# Patient Record
Sex: MALE | Race: WHITE | Employment: OTHER | URBAN - METROPOLITAN AREA
[De-identification: names, ages, dates, MRNs, and addresses within clinical notes are randomized per-mention and may not be internally consistent; named-entity substitution may affect disease eponyms.]

---

## 2017-06-14 ENCOUNTER — OFFICE VISIT (OUTPATIENT)
Dept: FAMILY MEDICINE | Facility: CLINIC | Age: 57
End: 2017-06-14
Payer: COMMERCIAL

## 2017-06-14 VITALS
DIASTOLIC BLOOD PRESSURE: 62 MMHG | HEART RATE: 82 BPM | RESPIRATION RATE: 14 BRPM | BODY MASS INDEX: 24.11 KG/M2 | WEIGHT: 178 LBS | OXYGEN SATURATION: 97 % | SYSTOLIC BLOOD PRESSURE: 118 MMHG | HEIGHT: 72 IN | TEMPERATURE: 97.6 F

## 2017-06-14 DIAGNOSIS — N48.1 BALANITIS: Primary | ICD-10-CM

## 2017-06-14 PROCEDURE — 99213 OFFICE O/P EST LOW 20 MIN: CPT | Performed by: FAMILY MEDICINE

## 2017-06-14 RX ORDER — NYSTATIN 100000 U/G
CREAM TOPICAL 3 TIMES DAILY
Qty: 15 G | Refills: 1 | Status: SHIPPED | OUTPATIENT
Start: 2017-06-14 | End: 2017-06-28

## 2017-06-14 ASSESSMENT — PAIN SCALES - GENERAL: PAINLEVEL: NO PAIN (0)

## 2017-06-14 NOTE — PATIENT INSTRUCTIONS
Balanitis    Balanitis is an inflammation of the head of the penis. It can happen because of a buildup of germs (bacteria, viruses, or fungi) under the foreskin. It can also happen because of exposure to soaps and other chemicals. In adults, this is most often a complication of diabetes. It can also happen because of obesity or poor genital cleaning habits.  If it is not treated right away, this can lead to a condition called phimosis. This means you cannot pull back the foreskin from the head of the penis.  Symptoms of balanitis may include pain or tenderness of the penis, discharge, inability to retract the foreskin, difficulty urinating, and impotence.  Home care  The following guidelines will help you care for your condition at home:    If you are able to retract your foreskin:    Children. Retract the foreskin and clean with water. Apply antibiotic cream or ointment to the penis three times a day.    Adults. Retract the foreskin and clean with water. Apply clotrimazole cream to the penis 3 times a day unless another medicine was prescribed. Clotrimazole cream is available over the counter. Avoid sexual activity while being treated.    Sitz baths. Soak the penis and foreskin in warm water while inflammation is present.    If you have diabetes, talk with your doctor about keeping your diabetes in good control.    If you are overweight, talk with your doctor about a weight loss plan.  Follow-up care  Follow up with your healthcare provider, or as advised.  When to seek medical advice  Call your healthcare provider right away if any of these occur:    You can't retract the foreskin    You can't return the retracted foreskin to the forward position. This requires immediate attention!    Your symptoms get worse    You have partial or complete blockage of the flow of urine  Date Last Reviewed: 9/1/2016 2000-2017 [employer]. [address] [place] [address], [place] [address]. All rights reserved. This  information is not intended as a substitute for professional medical care. Always follow your healthcare professional's instructions.

## 2017-06-14 NOTE — MR AVS SNAPSHOT
After Visit Summary   6/14/2017    Miguel Guzman    MRN: [id number]           Patient Information     Date Of Birth          1960        Visit Information        Provider Department      6/14/2017 5:00 PM Juan Orellana MD [facility]        Today's Diagnoses     Balanitis    -  1      Care Instructions      Balanitis    Balanitis is an inflammation of the head of the penis. It can happen because of a buildup of germs (bacteria, viruses, or fungi) under the foreskin. It can also happen because of exposure to soaps and other chemicals. In adults, this is most often a complication of diabetes. It can also happen because of obesity or poor genital cleaning habits.  If it is not treated right away, this can lead to a condition called phimosis. This means you cannot pull back the foreskin from the head of the penis.  Symptoms of balanitis may include pain or tenderness of the penis, discharge, inability to retract the foreskin, difficulty urinating, and impotence.  Home care  The following guidelines will help you care for your condition at home:    If you are able to retract your foreskin:    Children. Retract the foreskin and clean with water. Apply antibiotic cream or ointment to the penis three times a day.    Adults. Retract the foreskin and clean with water. Apply clotrimazole cream to the penis 3 times a day unless another medicine was prescribed. Clotrimazole cream is available over the counter. Avoid sexual activity while being treated.    Sitz baths. Soak the penis and foreskin in warm water while inflammation is present.    If you have diabetes, talk with your doctor about keeping your diabetes in good control.    If you are overweight, talk with your doctor about a weight loss plan.  Follow-up care  Follow up with your healthcare provider, or as advised.  When to seek medical advice  Call your healthcare provider right away if any of these occur:    You can't retract the  "foreskin    You can't return the retracted foreskin to the forward position. This requires immediate attention!    Your symptoms get worse    You have partial or complete blockage of the flow of urine  Date Last Reviewed: 9/1/2016 2000-2017 [employer]. [address] [place] [address]. All rights reserved. This information is not intended as a substitute for professional medical care. Always follow your healthcare professional's instructions.                Follow-ups after your visit        Follow-up notes from your care team     Return if symptoms worsen or fail to improve.      Who to contact     If you have questions or need follow up information about today's clinic visit or your schedule please contact [facility] directly at [phone].  Normal or non-critical lab and imaging results will be communicated to you by [employer], letter or phone within 4 business days after the clinic has received the results. If you do not hear from us within 7 days, please contact the clinic through [employer] or phone. If you have a critical or abnormal lab result, we will notify you by phone as soon as possible.  Submit refill requests through [employer] or call your pharmacy and they will forward the refill request to us. Please allow 3 business days for your refill to be completed.          Additional Information About Your Visit        [employer] Information     [employer] lets you send messages to your doctor, view your test results, renew your prescriptions, schedule appointments and more. To sign up, go to [email or web address] . Click on \"Log in\" on the left side of the screen, which will take you to the Welcome page. Then click on \"Sign up Now\" on the right side of the page.     You will be asked to enter the access code listed below, as well as some personal information. Please follow the directions to create your username and password.     Your access code is: [email or web address]  Expires: " 2017  5:09 PM     Your access code will  in 90 days. If you need help or a new code, please call your [place] clinic or [phone].        Care EveryWhere ID     This is your Care EveryWhere ID. This could be used by other organizations to access your [place] medical records  [id number]        Your Vitals Were     Pulse Temperature Respirations Height Pulse Oximetry BMI (Body Mass Index)    82 97.6  F (36.4  C) (Temporal) 14 6' (1.829 m) 97% 24.14 kg/m2       Blood Pressure from Last 3 Encounters:   17 118/62   14 104/68   13 118/82    Weight from Last 3 Encounters:   17 178 lb (80.7 kg)   14 181 lb (82.1 kg)   13 185 lb (83.9 kg)              Today, you had the following     No orders found for display         Today's Medication Changes          These changes are accurate as of: 17  5:09 PM.  If you have any questions, ask your nurse or doctor.               Start taking these medicines.        Dose/Directions    nystatin cream   Commonly known as:  MYCOSTATIN   Used for:  Balanitis   Started by:  Juan Orellana MD        Apply topically 3 times daily for 14 days   Quantity:  15 g   Refills:  1            Where to get your medicines      These medications were sent to [facility] [place], [place] - [address]   [address] , [facility] [address]     Phone:  [phone]     nystatin cream                Primary Care Provider    None Specified       No primary provider on file.        Thank you!     Thank you for choosing [facility]  for your care. Our goal is always to provide you with excellent care. Hearing back from our patients is one way we can continue to improve our services. Please take a few minutes to complete the written survey that you may receive in the mail after your visit with us. Thank you!             Your Updated Medication List - Protect others around you: Learn how to safely use, store and throw  away your medicines at www.disposemymeds.org.          This list is accurate as of: 6/14/17  5:09 PM.  Always use your most recent med list.                   Brand Name Dispense Instructions for use    nystatin cream    MYCOSTATIN    15 g    Apply topically 3 times daily for 14 days

## 2017-06-14 NOTE — PROGRESS NOTES
SUBJECTIVE:                                                    Miguel Guzman is a 56 year old male who presents to clinic today for the following health issues:    Rash     Onset: 1 month to 2 months     Description:   Location: groin  Character: round, flakey, red  Itching (Pruritis): YES    Progression of Symptoms:  same and intermittent    Accompanying Signs & Symptoms:  Fever: no   Body aches or joint pain: no   Sore throat symptoms: no   Recent cold symptoms: no    History:   Previous similar rash: maybe last year but went a way on own    Precipitating factors:   Exposure to similar rash: no   New exposures: soaps no fragrance   Recent travel: no     Alleviating factors:       Therapies Tried and outcome: Aleo           Problem list and histories reviewed & adjusted, as indicated.  Additional history: Improved with drying. Worse with wetness after sex.     Patient Active Problem List   Diagnosis     Neurofibromatosis, type 2 (acoustic neurofibromatosis) (H)     Hypertriglyceridemia     CARDIOVASCULAR SCREENING; LDL GOAL LESS THAN 160     Past Surgical History:   Procedure Laterality Date     C SHOULDER SURG PROC UNLISTED       COLONOSCOPY  3/31/2009    polyps     SURGICAL HISTORY OF -   07/18/2005    Excision of left acoustic neuroma with a translabyrinthine approach.       Social History   Substance Use Topics     Smoking status: Never Smoker     Smokeless tobacco: Never Used     Alcohol use Yes      Comment: 2 beers per week     No family history on file.      No current outpatient prescriptions on file.     Allergies   Allergen Reactions     No Known Drug Allergies      Recent Labs   Lab Test  07/24/14   1441  10/25/11   1658   LDL  108   --    HDL  28*   --    TRIG  205*   --    CR   --   1.28*   GFRESTIMATED   --   59*   GFRESTBLACK   --   71   POTASSIUM   --   4.1   TSH   --   0.95      BP Readings from Last 3 Encounters:   06/14/17 118/62   07/24/14 104/68   09/16/13 118/82    Wt Readings from Last 3  Encounters:   06/14/17 178 lb (80.7 kg)   07/24/14 181 lb (82.1 kg)   09/16/13 185 lb (83.9 kg)                    Reviewed and updated as needed this visit by clinical staff  Tobacco  Allergies  Meds  Problems       Reviewed and updated as needed this visit by Provider  Allergies  Meds  Problems         ROS:  C: NEGATIVE for fever, chills, change in weight  INTEGUMENTARY/SKIN: POSITIVE for rash genitalia  E/M: NEGATIVE for ear, mouth and throat problems  R: NEGATIVE for significant cough or SOB  CV: NEGATIVE for chest pain, palpitations or peripheral edema  ROS otherwise negative    OBJECTIVE:                                                    /62 (BP Location: Left arm, Patient Position: Sitting, Cuff Size: Adult Regular)  Pulse 82  Temp 97.6  F (36.4  C) (Temporal)  Resp 14  Ht 6' (1.829 m)  Wt 178 lb (80.7 kg)  SpO2 97%  BMI 24.14 kg/m2  Body mass index is 24.14 kg/(m^2).  GENERAL: healthy, alert and no distress   (male): normal male genitalia. Erythematous patch on the underside of the penis shaft extending up onto the glans without lesions or urethral discharge, no hernia    Diagnostic Test Results:  none      ASSESSMENT/PLAN:                                                      1. Balanitis  Acute most likely fungal associated with chronic moisture.   - nystatin (MYCOSTATIN) cream; Apply topically 3 times daily for 14 days  Dispense: 15 g; Refill: 1    Patient Instructions     Balanitis    Balanitis is an inflammation of the head of the penis. It can happen because of a buildup of germs (bacteria, viruses, or fungi) under the foreskin. It can also happen because of exposure to soaps and other chemicals. In adults, this is most often a complication of diabetes. It can also happen because of obesity or poor genital cleaning habits.  If it is not treated right away, this can lead to a condition called phimosis. This means you cannot pull back the foreskin from the head of the  penis.  Symptoms of balanitis may include pain or tenderness of the penis, discharge, inability to retract the foreskin, difficulty urinating, and impotence.  Home care  The following guidelines will help you care for your condition at home:    If you are able to retract your foreskin:    Children. Retract the foreskin and clean with water. Apply antibiotic cream or ointment to the penis three times a day.    Adults. Retract the foreskin and clean with water. Apply clotrimazole cream to the penis 3 times a day unless another medicine was prescribed. Clotrimazole cream is available over the counter. Avoid sexual activity while being treated.    Sitz baths. Soak the penis and foreskin in warm water while inflammation is present.    If you have diabetes, talk with your doctor about keeping your diabetes in good control.    If you are overweight, talk with your doctor about a weight loss plan.  Follow-up care  Follow up with your healthcare provider, or as advised.  When to seek medical advice  Call your healthcare provider right away if any of these occur:    You can't retract the foreskin    You can't return the retracted foreskin to the forward position. This requires immediate attention!    Your symptoms get worse    You have partial or complete blockage of the flow of urine  Date Last Reviewed: 9/1/2016 2000-2017 [employer]. [address] [place] [address], [place] [address]. All rights reserved. This information is not intended as a substitute for professional medical care. Always follow your healthcare professional's instructions.            Juan Orellana MD  [facility]

## 2017-06-14 NOTE — NURSING NOTE
Chief Complaint   Patient presents with     Derm Problem       Initial /62 (BP Location: Left arm, Patient Position: Sitting, Cuff Size: Adult Regular)  Pulse 82  Temp 97.6  F (36.4  C) (Temporal)  Resp 14  Ht 6' (1.829 m)  Wt 178 lb (80.7 kg)  SpO2 97%  BMI 24.14 kg/m2 Estimated body mass index is 24.14 kg/(m^2) as calculated from the following:    Height as of this encounter: 6' (1.829 m).    Weight as of this encounter: 178 lb (80.7 kg).  Medication Reconciliation: complete

## 2017-10-04 ENCOUNTER — HOSPITAL ENCOUNTER (EMERGENCY)
Facility: CLINIC | Age: 57
Discharge: HOME | End: 2017-10-04
Attending: EMERGENCY MEDICINE | Admitting: EMERGENCY MEDICINE
Payer: COMMERCIAL

## 2017-10-04 VITALS
DIASTOLIC BLOOD PRESSURE: 94 MMHG | OXYGEN SATURATION: 99 % | SYSTOLIC BLOOD PRESSURE: 148 MMHG | TEMPERATURE: 98.1 F | RESPIRATION RATE: 18 BRPM | HEART RATE: 87 BPM
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
## 2017-10-04 DIAGNOSIS — Z23 NEED FOR VACCINATION: ICD-10-CM

## 2017-10-04 DIAGNOSIS — S61.211A LACERATION OF LEFT INDEX FINGER WITHOUT FOREIGN BODY WITHOUT DAMAGE TO NAIL, INITIAL ENCOUNTER: ICD-10-CM

## 2017-10-04 PROCEDURE — 90715 TDAP VACCINE 7 YRS/> IM: CPT | Performed by: EMERGENCY MEDICINE

## 2017-10-04 PROCEDURE — 25000125 ZZHC RX 250: Performed by: EMERGENCY MEDICINE

## 2017-10-04 PROCEDURE — 12001 RPR S/N/AX/GEN/TRNK 2.5CM/<: CPT | Performed by: EMERGENCY MEDICINE

## 2017-10-04 PROCEDURE — 12001 RPR S/N/AX/GEN/TRNK 2.5CM/<: CPT | Mod: Z6 | Performed by: EMERGENCY MEDICINE

## 2017-10-04 PROCEDURE — 90471 IMMUNIZATION ADMIN: CPT | Performed by: EMERGENCY MEDICINE

## 2017-10-04 PROCEDURE — 99284 EMERGENCY DEPT VISIT MOD MDM: CPT | Mod: 25 | Performed by: EMERGENCY MEDICINE

## 2017-10-04 PROCEDURE — 25000128 H RX IP 250 OP 636: Performed by: EMERGENCY MEDICINE

## 2017-10-04 PROCEDURE — 99283 EMERGENCY DEPT VISIT LOW MDM: CPT | Mod: 25 | Performed by: EMERGENCY MEDICINE

## 2017-10-04 RX ADMIN — CLOSTRIDIUM TETANI TOXOID ANTIGEN (FORMALDEHYDE INACTIVATED), CORYNEBACTERIUM DIPHTHERIAE TOXOID ANTIGEN (FORMALDEHYDE INACTIVATED), BORDETELLA PERTUSSIS TOXOID ANTIGEN (GLUTARALDEHYDE INACTIVATED), BORDETELLA PERTUSSIS FILAMENTOUS HEMAGGLUTININ ANTIGEN (FORMALDEHYDE INACTIVATED), BORDETELLA PERTUSSIS PERTACTIN ANTIGEN, AND BORDETELLA PERTUSSIS FIMBRIAE 2/3 ANTIGEN 0.5 ML: 5; 2; 2.5; 5; 3; 5 INJECTION, SUSPENSION INTRAMUSCULAR at 19:28

## 2017-10-04 RX ADMIN — LIDOCAINE HYDROCHLORIDE 2 ML: 10 INJECTION, SOLUTION EPIDURAL; INFILTRATION; INTRACAUDAL; PERINEURAL at 19:33

## 2017-10-04 ASSESSMENT — ENCOUNTER SYMPTOMS: WOUND: 1

## 2017-10-04 NOTE — ED PROVIDER NOTES
History     Chief Complaint   Patient presents with     Laceration     finger     The history is provided by the patient.     Miguel Guzman is a 57 year old male who presents to the emergency department with concerns of a finger laceration. The patient states that he was cutting a piece of pine with a saw and the saw blade caught a knot in the board. The board got spun around and sliced the tip of his left 2nd digit, leaving a flap laceration. He wrapped his finger in gauze and tape after the incident occurred.     I have reviewed the Medications, Allergies, Past Medical and Surgical History, and Social History in the Epic system.    Patient Active Problem List   Diagnosis     Neurofibromatosis, type 2 (acoustic neurofibromatosis) (H)     Hypertriglyceridemia     CARDIOVASCULAR SCREENING; LDL GOAL LESS THAN 160     Past Medical History:   Diagnosis Date     Neurofibromatosis, type 2 (acoustic neurofibromatosis) (H)        Past Surgical History:   Procedure Laterality Date     C SHOULDER SURG PROC UNLISTED       COLONOSCOPY  3/31/2009    polyps     SURGICAL HISTORY OF -   07/18/2005    Excision of left acoustic neuroma with a translabyrinthine approach.     No family history on file.    Social History   Substance Use Topics     Smoking status: Never Smoker     Smokeless tobacco: Never Used     Alcohol use Yes      Comment: 2 beers per week        Immunization History   Administered Date(s) Administered     Influenza (IIV3) 11/04/2011     TD (ADULT, 7+) 07/13/2005     Allergies   Allergen Reactions     No Known Drug Allergies      No current outpatient prescriptions on file.     Review of Systems   Skin: Positive for wound.   All other systems reviewed and are negative.      Physical Exam   BP: 148/88  Pulse: 79  Temp: 98.1  F (36.7  C)  Resp: 18  SpO2: 99 %  Physical Exam   Constitutional: He is oriented to person, place, and time. He appears well-developed and well-nourished.   HENT:   Head: Atraumatic.   Eyes:  "EOM are normal.   Neck: Neck supple.   Cardiovascular: Normal rate, regular rhythm and normal heart sounds.    Pulmonary/Chest: Effort normal and breath sounds normal.   Abdominal: Soft. Bowel sounds are normal.   Musculoskeletal: Normal range of motion.        Hands:  Neurological: He is alert and oriented to person, place, and time.   Skin: Skin is warm and dry. Laceration (left second digit) noted.   Psychiatric: He has a normal mood and affect. His behavior is normal.   Nursing note and vitals reviewed.      ED Course     ED Course     Laceration repair  Date/Time: 10/4/2017 6:55 PM  Performed by: MARY SERRANO  Authorized by: MARY SERRANO   Consent: Verbal consent obtained.  Consent given by: patient  Patient identity confirmed: verbally with patient and arm band  Time out: Immediately prior to procedure a \"time out\" was called to verify the correct patient, procedure, equipment, support staff and site/side marked as required.  Body area: upper extremity  Location details: left index finger  Laceration length: 1.1 cm  Foreign bodies: no foreign bodies  Anesthesia: local infiltration    Anesthesia:  Local Anesthetic: lidocaine 1% without epinephrine  Anesthetic total: 2 mL  Preparation: Patient was prepped and draped in the usual sterile fashion.  Irrigation solution: tap water  Amount of cleaning: standard  Skin closure: 4-0 nylon and Ethilon  Number of sutures: 7  Technique: simple  Approximation: close  Approximation difficulty: simple  Dressing: antibiotic ointment, 4x4 sterile gauze and tube gauze  Patient tolerance: Patient tolerated the procedure well with no immediate complications                  No results found for this or any previous visit (from the past 24 hour(s)).    Medications   lidocaine 1 % 2 mL (not administered)   Tdap (tetanus-diphtheria-acell pertussis) (ADACEL) injection 0.5 mL (not administered)     Assessments & Plan (with Medical Decision Making)  Miguel Guzman " is a 57-year-old male who presents to the ED for evaluation and treatment of the left index finger injury that occurred when he was cutting a piece of pine wood with a saw and the wood kicked back striking him in the finger causing the laceration.  On exam, he has a 1.1 cm flap laceration to the tip of the left index finger that just approaches the nailbed.  In addition, his last tetanus booster was in 2005 so we will need to administer a TDaP today.  The wound was opened and examined after placement of a turnicot.  The wound was cleaned out and anesthetized with 1% lidocaine without epinephrine.  The wound was then closed using 4-0 Ethilon requiring 7 sutures with good coaptation of the edges.  Patient tolerated this procedure well.  I did discuss with him indications to return to the ED including recurrent bleeding, infection, increasing pain, or numbness.  Otherwise, he will have the sutures removed at the [facility] in 10 days.  All questions were answered and he was suitable for discharge in satisfactory condition.       I have reviewed the nursing notes.    I have reviewed the findings, diagnosis, plan and need for follow up with the patient.       New Prescriptions    No medications on file       Final diagnoses:   Laceration of left index finger without foreign body without damage to nail, initial encounter     This document serves as a record of services personally performed by Martínez Mckeon MD. It was created on their behalf by Nai Braun, a trained medical scribe. The creation of this record is based on the provider's personal observations and the statements of the patient. This document has been checked and approved by the attending provider.    Note: Chart documentation done in part with Dragon Voice Recognition software. Although reviewed after completion, some word and grammatical errors may remain.    10/4/2017   [facility] EMERGENCY DEPARTMENT     Martínez Mckeon  MD KYLEIGH  10/04/17 [id number]

## 2017-10-04 NOTE — ED AVS SNAPSHOT
[facility] Emergency Department    [address] [place] [address]    Phone:  [phone]    Fax:  [phone]                                       Miguel Guzman   MRN: [id number]    Department:  [facility] Emergency Department   Date of Visit:  10/4/2017           After Visit Summary Signature Page     I have received my discharge instructions, and my questions have been answered. I have discussed any challenges I see with this plan with the nurse or doctor.    ..........................................................................................................................................  Patient/Patient Representative Signature      ..........................................................................................................................................  Patient Representative Print Name and Relationship to Patient    ..................................................               ................................................  Date                                            Time    ..........................................................................................................................................  Reviewed by Signature/Title    ...................................................              ..............................................  Date                                                            Time

## 2017-10-04 NOTE — ED AVS SNAPSHOT
[facility] Emergency Department    [address] [place] [address]    Phone:  [phone]    Fax:  [phone]                                       Miguel Guzman   MRN: [id number]    Department:  [facility] Emergency Department   Date of Visit:  10/4/2017           Patient Information     Date Of Birth          1960        Your diagnoses for this visit were:     Laceration of left index finger without foreign body without damage to nail, initial encounter        You were seen by Martínez Mckeon MD.      Follow-up Information     Follow up with [facility]. Schedule an appointment as soon as possible for a visit in 10 days.    Why:  For suture removal    Contact information:    [address] [facility] [phone]      Discharge References/Attachments     LACERATION, EXTREMITY: SUTURE, STAPLE, OR TAPE (ENGLISH)      24 Hour Appointment Hotline       To make an appointment at any [facility], call [phone] ([phone]). If you don't have a family doctor or clinic, we will help you find one. [facility] are conveniently located to serve the needs of you and your family.             Review of your medicines      Notice     You have not been prescribed any medications.            Orders Needing Specimen Collection     None      Pending Results     No orders found from 10/2/2017 to 10/5/2017.            Pending Culture Results     No orders found from 10/2/2017 to 10/5/2017.            Pending Results Instructions     If you had any lab results that were not finalized at the time of your Discharge, you can call the ED Lab Result RN at [phone]. You will be contacted by this team for any positive Lab results or changes in treatment. The nurses are available 7 days a week from 10A to 6:30P.  You can leave a message 24 hours per day and they will return your call.        Thank you for choosing [facility]       Thank  "you for choosing [facility] for your care. Our goal is always to provide you with excellent care. Hearing back from our patients is one way we can continue to improve our services. Please take a few minutes to complete the written survey that you may receive in the mail after you visit with us. Thank you!        [facility] Information     [employer] lets you send messages to your doctor, view your test results, renew your prescriptions, schedule appointments and more. To sign up, go to [email or web address] . Click on \"Log in\" on the left side of the screen, which will take you to the Welcome page. Then click on \"Sign up Now\" on the right side of the page.     You will be asked to enter the access code listed below, as well as some personal information. Please follow the directions to create your username and password.     Your access code is: [email or web address]  Expires: 2018  7:24 PM     Your access code will  in 90 days. If you need help or a new code, please call your [facility] clinic or [phone].        Care EveryWhere ID     This is your Care EveryWhere ID. This could be used by other organizations to access your [facility] medical records  [id number]        Equal Access to Services     MITUL SNOW : Anisa Fountain, bibi marcano, qaphilip kasofia feliciano, keisha matthews. So [facility] [phone].    ATENCIÓN: Si habla español, tiene a thomas disposición servicios gratuitos de asistencia lingüística. Llame al [phone].    We comply with applicable federal civil rights laws and [place] laws. We do not discriminate on the basis of race, color, national origin, age, disability, sex, sexual orientation, or gender identity.            After Visit Summary       This is your record. Keep this with you and show to your community pharmacist(s) and doctor(s) at your next visit.                  "

## 2017-10-04 NOTE — ED NOTES
PT was using an electric saw when it kicked back injuring left index finger.  Blooding controlled.  Last dt:  2005 per [facility]

## 2017-10-16 ENCOUNTER — ALLIED HEALTH/NURSE VISIT (OUTPATIENT)
Dept: FAMILY MEDICINE | Facility: CLINIC | Age: 57
End: 2017-10-16
Payer: COMMERCIAL

## 2017-10-16 VITALS — TEMPERATURE: 97.4 F

## 2017-10-16 DIAGNOSIS — Z48.02 ENCOUNTER FOR REMOVAL OF SUTURES: Primary | ICD-10-CM

## 2017-10-16 PROCEDURE — 99207 ZZC NO CHARGE NURSE ONLY: CPT

## 2017-10-16 NOTE — MR AVS SNAPSHOT
"              After Visit Summary   10/16/2017    Miguel Guzman    MRN: [id number]           Patient Information     Date Of Birth          1960        Visit Information        Provider Department      10/16/2017 4:00 PM NL RN TEAM A, [facility]        Today's Diagnoses     Encounter for removal of sutures    -  1       Follow-ups after your visit        Who to contact     If you have questions or need follow up information about today's clinic visit or your schedule please contact [facility] directly at [phone].  Normal or non-critical lab and imaging results will be communicated to you by MyChart, letter or phone within 4 business days after the clinic has received the results. If you do not hear from us within 7 days, please contact the clinic through [facility] or phone. If you have a critical or abnormal lab result, we will notify you by phone as soon as possible.  Submit refill requests through [employer] or call your pharmacy and they will forward the refill request to us. Please allow 3 business days for your refill to be completed.          Additional Information About Your Visit        MyChart Information     [employer] lets you send messages to your doctor, view your test results, renew your prescriptions, schedule appointments and more. To sign up, go to [email or web address] . Click on \"Log in\" on the left side of the screen, which will take you to the Welcome page. Then click on \"Sign up Now\" on the right side of the page.     You will be asked to enter the access code listed below, as well as some personal information. Please follow the directions to create your username and password.     Your access code is: [email or web address]  Expires: 2018  7:24 PM     Your access code will  in 90 days. If you need help or a new code, please call your [facility] or [phone].        Care EveryWhere ID     This is your Care EveryWhere ID. This could be used by " other organizations to access your [facility] medical records  [id number]        Your Vitals Were     Temperature                   97.4  F (36.3  C) (Temporal)            Blood Pressure from Last 3 Encounters:   10/04/17 (!) 148/94   06/14/17 118/62   07/24/14 104/68    Weight from Last 3 Encounters:   06/14/17 178 lb (80.7 kg)   07/24/14 181 lb (82.1 kg)   09/16/13 185 lb (83.9 kg)              Today, you had the following     No orders found for display       Primary Care Provider    Physician No Ref-Primary       NO REF-PRIMARY PHYSICIAN        Equal Access to Services     MADAI [facility] Center[facility]ISSATOU : Hadmalina Fountain, bibi marcano, teodoro feliciano, keisha hernandez . So [facility] [phone].    ATENCIÓN: Si habla español, tiene a thomas disposición servicios gratuitos de asistencia lingüística. Llame al [phone].    We comply with applicable federal civil rights laws and [place] laws. We do not discriminate on the basis of race, color, national origin, age, disability, sex, sexual orientation, or gender identity.            Thank you!     Thank you for choosing [facility]  for your care. Our goal is always to provide you with excellent care. Hearing back from our patients is one way we can continue to improve our services. Please take a few minutes to complete the written survey that you may receive in the mail after your visit with us. Thank you!             Your Updated Medication List - Protect others around you: Learn how to safely use, store and throw away your medicines at www.disposemymeds.org.      Notice  As of 10/16/2017  4:23 PM    You have not been prescribed any medications.

## 2018-05-03 ENCOUNTER — TRANSFERRED RECORDS (OUTPATIENT)
Dept: MULTI SPECIALTY CLINIC | Facility: CLINIC | Age: 58
End: 2018-05-03

## 2018-10-01 NOTE — NURSING NOTE
"Miguel Guzman presents to the clinic today for  removal of sutures.  The patient has had the sutures in place for 12 days.    There has been no history of infection or drainage.    O: 6 sutures are seen located on the Left index finger.  The wound is healing well with no signs of infection.    Tetanus status is up to date.    A: Suture removal. \" Dr. HOUSTON said he thought he put in 8 , then he recounted and he thought 7 , but I see only 6.\"  RN only saw 6 as well.  Pt was instructed to call us back if when his scab falls off and there is a suture string sticking out of his skin to come back in and we will remove it.     P:  All sutures were easily removed today.  Routine wound care discussed.  The patient will follow up as needed.  JAMAICA Carnes        "
162.56

## 2022-06-23 ENCOUNTER — OFFICE VISIT (OUTPATIENT)
Dept: FAMILY MEDICINE | Facility: CLINIC | Age: 62
End: 2022-06-23
Payer: COMMERCIAL

## 2022-06-23 ENCOUNTER — TELEPHONE (OUTPATIENT)
Dept: FAMILY MEDICINE | Facility: CLINIC | Age: 62
End: 2022-06-23

## 2022-06-23 VITALS
WEIGHT: 189 LBS | RESPIRATION RATE: 14 BRPM | TEMPERATURE: 97.8 F | DIASTOLIC BLOOD PRESSURE: 82 MMHG | OXYGEN SATURATION: 98 % | SYSTOLIC BLOOD PRESSURE: 138 MMHG | HEART RATE: 63 BPM | HEIGHT: 71 IN | BODY MASS INDEX: 26.46 KG/M2

## 2022-06-23 DIAGNOSIS — R39.9 LOWER URINARY TRACT SYMPTOMS (LUTS): ICD-10-CM

## 2022-06-23 DIAGNOSIS — Q85.02 NEUROFIBROMATOSIS, TYPE 2 (ACOUSTIC NEUROFIBROMATOSIS) (H): ICD-10-CM

## 2022-06-23 DIAGNOSIS — Z13.0 SCREENING, ANEMIA, DEFICIENCY, IRON: ICD-10-CM

## 2022-06-23 DIAGNOSIS — Z87.39 HISTORY OF GOUT: Primary | ICD-10-CM

## 2022-06-23 DIAGNOSIS — Z13.1 SCREENING FOR DIABETES MELLITUS: ICD-10-CM

## 2022-06-23 DIAGNOSIS — Z12.11 SCREEN FOR COLON CANCER: ICD-10-CM

## 2022-06-23 DIAGNOSIS — Z13.220 SCREENING FOR HYPERLIPIDEMIA: ICD-10-CM

## 2022-06-23 DIAGNOSIS — K21.00 GASTROESOPHAGEAL REFLUX DISEASE WITH ESOPHAGITIS WITHOUT HEMORRHAGE: ICD-10-CM

## 2022-06-23 LAB
ANION GAP SERPL CALCULATED.3IONS-SCNC: 3 MMOL/L (ref 3–14)
BUN SERPL-MCNC: 18 MG/DL (ref 7–30)
CALCIUM SERPL-MCNC: 9.3 MG/DL (ref 8.5–10.1)
CHLORIDE BLD-SCNC: 107 MMOL/L (ref 94–109)
CHOLEST SERPL-MCNC: 167 MG/DL
CO2 SERPL-SCNC: 30 MMOL/L (ref 20–32)
CREAT SERPL-MCNC: 1.16 MG/DL (ref 0.66–1.25)
ERYTHROCYTE [DISTWIDTH] IN BLOOD BY AUTOMATED COUNT: 12 % (ref 10–15)
FASTING STATUS PATIENT QL REPORTED: NO
GFR SERPL CREATININE-BSD FRML MDRD: 71 ML/MIN/1.73M2
GLUCOSE BLD-MCNC: 101 MG/DL (ref 70–99)
HCT VFR BLD AUTO: 53 % (ref 40–53)
HDLC SERPL-MCNC: 36 MG/DL
HGB BLD-MCNC: 17.6 G/DL (ref 13.3–17.7)
LDLC SERPL CALC-MCNC: 99 MG/DL
MCH RBC QN AUTO: 31 PG (ref 26.5–33)
MCHC RBC AUTO-ENTMCNC: 33.2 G/DL (ref 31.5–36.5)
MCV RBC AUTO: 94 FL (ref 78–100)
NONHDLC SERPL-MCNC: 131 MG/DL
PLATELET # BLD AUTO: 175 10E3/UL (ref 150–450)
POTASSIUM BLD-SCNC: 4.7 MMOL/L (ref 3.4–5.3)
PSA SERPL-MCNC: 1.29 UG/L (ref 0–4)
RBC # BLD AUTO: 5.67 10E6/UL (ref 4.4–5.9)
SODIUM SERPL-SCNC: 140 MMOL/L (ref 133–144)
TRIGL SERPL-MCNC: 159 MG/DL
URATE SERPL-MCNC: 10.2 MG/DL (ref 3.5–7.2)
WBC # BLD AUTO: 5.6 10E3/UL (ref 4–11)

## 2022-06-23 PROCEDURE — G0103 PSA SCREENING: HCPCS | Performed by: FAMILY MEDICINE

## 2022-06-23 PROCEDURE — 36415 COLL VENOUS BLD VENIPUNCTURE: CPT | Performed by: FAMILY MEDICINE

## 2022-06-23 PROCEDURE — 80061 LIPID PANEL: CPT | Performed by: FAMILY MEDICINE

## 2022-06-23 PROCEDURE — 80048 BASIC METABOLIC PNL TOTAL CA: CPT | Performed by: FAMILY MEDICINE

## 2022-06-23 PROCEDURE — 99204 OFFICE O/P NEW MOD 45 MIN: CPT | Performed by: FAMILY MEDICINE

## 2022-06-23 PROCEDURE — 85027 COMPLETE CBC AUTOMATED: CPT | Performed by: FAMILY MEDICINE

## 2022-06-23 PROCEDURE — 84550 ASSAY OF BLOOD/URIC ACID: CPT | Performed by: FAMILY MEDICINE

## 2022-06-23 NOTE — TELEPHONE ENCOUNTER
Number listed is no longer in service. Letter was sent.    Yessy Jones CMA ([facility])

## 2022-06-23 NOTE — RESULT ENCOUNTER NOTE
Please inform of results if patient has not viewed in [employer] within 3 business days.    CBC Results - Your cell counts were normal.    Your other labs are pending.    Please call the clinic with any questions you may have.     Have a great day,    Dr. Mortensen

## 2022-06-23 NOTE — RESULT ENCOUNTER NOTE
"Please inform of results if patient has not viewed in [employer] within 3 business days.    PSA - Your Prostate cancer screening lab results were normal.    Lipids - Your good cholesterol labs were slightly low. This can be improved with exercise. Your \"bad\" cholesterol lab results were normal.    BMP - Your blood sugar was 101. Your kidney function and electrolytes were normal.    Uric Acid - Your uric acid was 10.2. This is above our goal of 6. If you want to start a medication (allopurinol) for this please let me know and we will start at 100 mg as discussed and work our way up.    Please call the clinic with any questions you may have.     Have a great day,    Dr. Mortensen"

## 2022-06-23 NOTE — PROGRESS NOTES
Assessment & Plan   1. History of gout: Discussed medications (allopurinol) to reduce uric acid and prevent gout flares. Discussed paradoxical gout flares with initiation and dose changes of allopurinol and possible need for indomethacin/cholcecine to reduce gout flares. Indomethacin may worsen GERD. He is not wishing for medications at this time. Ok with checking uric acid level. Discussed wanting this below 6. Discussed low purine diet. May have to look at purine free beer if that is something patient wishes to partake in. Patient will let me know based on uric acid level if he wants to start allopurinol or not.  - Uric acid; Future  - Basic metabolic panel  (Ca, Cl, CO2, Creat, Gluc, K, Na, BUN); Future  - Uric acid    2. Gastroesophageal reflux disease with esophagitis without hemorrhage: Discussed use of wedge pillow. Discussed triggering foods avoidance. Controlled with omeprazole.    3. Lower urinary tract symptoms (LUTS): Not interested in Flomax trial. Could consider post void residual. Will check PSA. Discussed avoiding caffeine, alcohol. Discussed double voiding, fluid restriction, etc.  - PSA, screen; Future  - PSA, screen    4. Screen for colon cancer: Abstract outside colonoscopy    5. Screening for hyperlipidemia  - Lipid panel reflex to direct LDL Fasting; Future    6. Screening, anemia, deficiency, iron  - CBC with platelets; Future    7. Screening for diabetes mellitus  - Basic metabolic panel  (Ca, Cl, CO2, Creat, Gluc, K, Na, BUN); Future    8. 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        Return in about 2 weeks (around 7/7/2022) for BP Recheck.    Arsh Joseph MD  [facility]    This chart is completed utilizing dictation software; typos and/or incorrect word substitutions may unintentionally occur.       Carolynn Rivera is a 61 year old, presenting for the [facility]  "issues:  Arthritis (gout)    Arthritis    History of Present Illness       Reason for visit:  Gout and prostrate  Symptom onset:  More than a month  Symptom intensity:  Mild  Symptom progression:  Improving  Had these symptoms before:  Yes  Has tried/received treatment for these symptoms:  No    He eats 0-1 servings of fruits and vegetables daily.He consumes 2 sweetened beverage(s) daily.He exercises with enough effort to increase his heart rate 10 to 19 minutes per day.  He exercises with enough effort to increase his heart rate 3 or less days per week.   He is taking medications regularly.     History of gout. Had gout flare while in [place] left foot. Thought this was odd as he usually gets it in the right great toe. Feels he has been getting more frequent attacks since covid shots.    Cut out alcohol. No further gout issues since that time.     Kidney stone 12 years ago.    Wants to be able to have a beer/glass of wine with dinner.    No problems sleeping, but bladder is not fully emptying.    Also wondering if he can get off omeprazole or what he can do to reduce GERD.    Review of Systems   Musculoskeletal: Positive for arthritis.      Constitutional, HEENT, cardiovascular, pulmonary, GI, , musculoskeletal, neuro, skin, endocrine and psych systems are negative, except as otherwise noted.       Objective    /82   Pulse 63   Temp 97.8  F (36.6  C) (Temporal)   Resp 14   Ht 1.795 m (5' 10.67\")   Wt 85.7 kg (189 lb)   SpO2 98%   BMI 26.61 kg/m    Body mass index is 26.61 kg/m .  Physical Exam   General: Appears well and in no acute distress.  Cardiovascular: Regular rate and rhythm, normal S1 and S2 without murmur. No extra heartsounds or friction rub. Radial pulses present and equal bilaterally.  Respiratory: Lungs clear to auscultation bilaterally. No wheezing or crackles. No prolonged expiration. Symmetrical chest rise.  Musculoskeletal: No gross extremity deformities. No peripheral edema. Normal " muscle bulk.    Labs: pending            .  ..

## 2022-06-23 NOTE — TELEPHONE ENCOUNTER
----- Message from Arsh Joseph MD sent at 6/23/2022 12:53 PM CDT -----  Please inform of results if patient has not viewed in [employer] within 3 business days.    CBC Results - Your cell counts were normal.    Your other labs are pending.    Please call the clinic with any questions you may have.     Have a great day,    Dr. Mortensen

## 2022-06-23 NOTE — LETTER
June 23, 2022      Miguel Guzman  [address]  [place] [address]        Dear ,    We are writing to inform you of your test results.    Your test results fall within the expected range(s) or remain unchanged from previous results.  Please continue with current treatment plan.    Resulted Orders   CBC with platelets   Result Value Ref Range    WBC Count 5.6 4.0 - 11.0 10e3/uL    RBC Count 5.67 4.40 - 5.90 10e6/uL    Hemoglobin 17.6 13.3 - 17.7 g/dL    Hematocrit 53.0 40.0 - 53.0 %    MCV 94 78 - 100 fL    MCH 31.0 26.5 - 33.0 pg    MCHC 33.2 31.5 - 36.5 g/dL    RDW 12.0 10.0 - 15.0 %    Platelet Count 175 150 - 450 10e3/uL       If you have any questions or concerns, please call the clinic at the number listed above.       Sincerely,      Arsh Joseph MD

## 2022-06-23 NOTE — TELEPHONE ENCOUNTER
"----- Message from Annie Valentin sent at 6/23/2022  3:56 PM CDT -----    ----- Message -----  From: Arsh Joseph MD  Sent: 6/23/2022   3:06 PM CDT  To: [facility] Patient Care Team    Please inform of results if patient has not viewed in [employer] within 3 business days.    PSA - Your Prostate cancer screening lab results were normal.    Lipids - Your good cholesterol labs were slightly low. This can be improved with exercise. Your \"bad\" cholesterol lab results were normal.    BMP - Your blood sugar was 101. Your kidney function and electrolytes were normal.    Uric Acid - Your uric acid was 10.2. This is above our goal of 6. If you want to start a medication (allopurinol) for this please let me know and we will start at 100 mg as discussed and work our way up.    Please call the clinic with any questions you may have.     Have a great day,    Dr. Mortensen    "

## 2022-06-23 NOTE — TELEPHONE ENCOUNTER
Number is an invalid number. No other number listed. Letter sent.    Yessy Jones CMA ([facility])

## 2022-07-30 ENCOUNTER — HEALTH MAINTENANCE LETTER (OUTPATIENT)
Age: 62
End: 2022-07-30

## 2022-10-09 ENCOUNTER — HEALTH MAINTENANCE LETTER (OUTPATIENT)
Age: 62
End: 2022-10-09

## 2023-08-19 ENCOUNTER — HEALTH MAINTENANCE LETTER (OUTPATIENT)
Age: 63
End: 2023-08-19

## 2023-09-25 ENCOUNTER — ANCILLARY PROCEDURE (OUTPATIENT)
Dept: GENERAL RADIOLOGY | Facility: CLINIC | Age: 63
End: 2023-09-25
Attending: PHYSICIAN ASSISTANT
Payer: COMMERCIAL

## 2023-09-25 ENCOUNTER — OFFICE VISIT (OUTPATIENT)
Dept: FAMILY MEDICINE | Facility: CLINIC | Age: 63
End: 2023-09-25
Payer: COMMERCIAL

## 2023-09-25 VITALS
OXYGEN SATURATION: 97 % | WEIGHT: 197 LBS | BODY MASS INDEX: 27.58 KG/M2 | HEIGHT: 71 IN | TEMPERATURE: 97.7 F | DIASTOLIC BLOOD PRESSURE: 70 MMHG | HEART RATE: 68 BPM | RESPIRATION RATE: 18 BRPM | SYSTOLIC BLOOD PRESSURE: 126 MMHG

## 2023-09-25 DIAGNOSIS — Z12.5 SCREENING FOR PROSTATE CANCER: ICD-10-CM

## 2023-09-25 DIAGNOSIS — Z13.0 SCREENING FOR DEFICIENCY ANEMIA: ICD-10-CM

## 2023-09-25 DIAGNOSIS — Q85.02 NEUROFIBROMATOSIS, TYPE 2 (ACOUSTIC NEUROFIBROMATOSIS) (H): ICD-10-CM

## 2023-09-25 DIAGNOSIS — Z13.1 SCREENING FOR DIABETES MELLITUS: ICD-10-CM

## 2023-09-25 DIAGNOSIS — E78.1 HYPERTRIGLYCERIDEMIA: ICD-10-CM

## 2023-09-25 DIAGNOSIS — Z87.39 HISTORY OF GOUT: Primary | ICD-10-CM

## 2023-09-25 DIAGNOSIS — M79.671 RIGHT FOOT PAIN: ICD-10-CM

## 2023-09-25 DIAGNOSIS — Z87.39 HISTORY OF GOUT: ICD-10-CM

## 2023-09-25 LAB
ANION GAP SERPL CALCULATED.3IONS-SCNC: 9 MMOL/L (ref 7–15)
BUN SERPL-MCNC: 18.2 MG/DL (ref 8–23)
CALCIUM SERPL-MCNC: 9.5 MG/DL (ref 8.8–10.2)
CHLORIDE SERPL-SCNC: 103 MMOL/L (ref 98–107)
CHOLEST SERPL-MCNC: 157 MG/DL
CREAT SERPL-MCNC: 1.18 MG/DL (ref 0.67–1.17)
DEPRECATED HCO3 PLAS-SCNC: 28 MMOL/L (ref 22–29)
EGFRCR SERPLBLD CKD-EPI 2021: 68 ML/MIN/1.73M2
ERYTHROCYTE [DISTWIDTH] IN BLOOD BY AUTOMATED COUNT: 11.7 % (ref 10–15)
GLUCOSE SERPL-MCNC: 104 MG/DL (ref 70–99)
HCT VFR BLD AUTO: 48.5 % (ref 40–53)
HDLC SERPL-MCNC: 28 MG/DL
HGB BLD-MCNC: 16.8 G/DL (ref 13.3–17.7)
LDLC SERPL CALC-MCNC: 96 MG/DL
MCH RBC QN AUTO: 31.9 PG (ref 26.5–33)
MCHC RBC AUTO-ENTMCNC: 34.6 G/DL (ref 31.5–36.5)
MCV RBC AUTO: 92 FL (ref 78–100)
NONHDLC SERPL-MCNC: 129 MG/DL
PLATELET # BLD AUTO: 189 10E3/UL (ref 150–450)
POTASSIUM SERPL-SCNC: 4.4 MMOL/L (ref 3.4–5.3)
PSA SERPL DL<=0.01 NG/ML-MCNC: 1.42 NG/ML (ref 0–4.5)
RBC # BLD AUTO: 5.27 10E6/UL (ref 4.4–5.9)
SODIUM SERPL-SCNC: 140 MMOL/L (ref 136–145)
TRIGL SERPL-MCNC: 164 MG/DL
URATE SERPL-MCNC: 8.3 MG/DL (ref 3.4–7)
WBC # BLD AUTO: 6 10E3/UL (ref 4–11)

## 2023-09-25 PROCEDURE — 80048 BASIC METABOLIC PNL TOTAL CA: CPT | Performed by: PHYSICIAN ASSISTANT

## 2023-09-25 PROCEDURE — 73630 X-RAY EXAM OF FOOT: CPT | Mod: TC | Performed by: RADIOLOGY

## 2023-09-25 PROCEDURE — 99214 OFFICE O/P EST MOD 30 MIN: CPT | Mod: 25 | Performed by: PHYSICIAN ASSISTANT

## 2023-09-25 PROCEDURE — G0103 PSA SCREENING: HCPCS | Performed by: PHYSICIAN ASSISTANT

## 2023-09-25 PROCEDURE — 85027 COMPLETE CBC AUTOMATED: CPT | Performed by: PHYSICIAN ASSISTANT

## 2023-09-25 PROCEDURE — 84550 ASSAY OF BLOOD/URIC ACID: CPT | Performed by: PHYSICIAN ASSISTANT

## 2023-09-25 PROCEDURE — G0008 ADMIN INFLUENZA VIRUS VAC: HCPCS | Performed by: PHYSICIAN ASSISTANT

## 2023-09-25 PROCEDURE — 80061 LIPID PANEL: CPT | Performed by: PHYSICIAN ASSISTANT

## 2023-09-25 PROCEDURE — 90662 IIV NO PRSV INCREASED AG IM: CPT | Performed by: PHYSICIAN ASSISTANT

## 2023-09-25 PROCEDURE — 36415 COLL VENOUS BLD VENIPUNCTURE: CPT | Performed by: PHYSICIAN ASSISTANT

## 2023-09-25 ASSESSMENT — PAIN SCALES - GENERAL: PAINLEVEL: MODERATE PAIN (5)

## 2023-09-25 NOTE — Clinical Note
Colonoscopy every 10 years, last done health [facility] 05/03/2018 see care everywhere

## 2023-09-25 NOTE — PROGRESS NOTES
"  Assessment & Plan     History of gout  Patient is still not interested in starting allopurinol but would like to improve his diet and recheck the uric acid level while he is in the office today.  He will work to lessen the meat he eats and increase low purine sources of nutrients.  We will also work on increasing his water intake he will continue to treat his pain as he has been if his symptoms or not improving over the next 1 to 2 weeks they may message and I would recommend seeing podiatry at that time  - Basic metabolic panel; Future  - Uric acid; Future  - XR Foot Right G/E 3 Views; Future  - Basic metabolic panel  - Uric acid  Should he have any acute worsening of his pain to include increased swelling, redness, or if he should develop fevers or chills he should be seen at once  Right foot pain  We will rule out any bony pathology per the request  - XR Foot Right G/E 3 Views; Future    Hypertriglyceridemia  Patient is fasting today  - Lipid panel reflex to direct LDL Fasting; Future  - Lipid panel reflex to direct LDL Fasting    Screening for diabetes mellitus      Screening for deficiency anemia    - CBC with platelets; Future  - CBC with platelets    Screening for prostate cancer    - Prostate Specific Antigen Screen; Future  - Prostate Specific Antigen Screen    Neurofibromatosis, type 2 (acoustic neurofibromatosis) (H)  Had resected by ear nose and throat in 2005    Initial blood pressure was elevated, we discussed reducing his sodium intake and increasing his water intake       BMI:   Estimated body mass index is 27.73 kg/m  as calculated from the following:    Height as of this encounter: 1.795 m (5' 10.67\").    Weight as of this encounter: 89.4 kg (197 lb).       This chart documentation was completed in part with Dragon voice recognition software.  Documentation is reviewed after completion, however, some words and grammatical errors may remain.  VLADIMIR Santiago PA-C M " [facility] SHELLIE Rivera is a 63 year old, presenting for the following health issues:  Foot Pain        9/25/2023     6:53 AM   Additional Questions   Roomed by Qiana JANE CMA   Accompanied by spouse         9/25/2023     6:53 AM   Patient Reported Additional Medications   Patient reports taking the following new medications n/a       History of Present Illness       Reason for visit:  Foot pain  Symptom onset:  3-4 weeks ago  Symptoms include:  Swelling pain redness  Symptom intensity:  Moderate  Symptom progression:  Improving  Had these symptoms before:  Yes  Has tried/received treatment for these symptoms:  Yes  Previous treatment was successful:  Yes  Prior treatment description:  Pills  What makes it worse:  Walking  What makes it better:  None    He eats 0-1 servings of fruits and vegetables daily.He consumes 1 sweetened beverage(s) daily.He exercises with enough effort to increase his heart rate 9 or less minutes per day.  He exercises with enough effort to increase his heart rate 3 or less days per week.   He is taking medications regularly.     He has had intermittent foot pain and gout for the past several years.  He reports that he had his first episode of gout shortly after his first COVID vaccination, and then with subsequent vaccination at least once he developed gout as well.  He was found to have an elevated uric acid level in June, allopurinol was recommended however he does not want to take more medications.  Admittedly, he does eat a lot of meat and knows that he should improve this.  In the past he has had gout of his right great toe.  Now he is having right ankle pain.  He had a lot of redness and swelling which is improved but not resolved.  The pain in his foot/ankle now is making him walk differently which is causing knee pain he thinks.  Overall his symptoms have improved he does take ibuprofen 400 to 600 mg as needed.  He does not always take this with food.  He  "notices once he is up and moving the stiffness of the joint seems to lessen and it loosens up.  Walking does not intensify his symptoms, specifically walking farther does not cause pain of the calf or of the foot.  He does not have nonhealing sores on his lower leg.  His significant other is very worried about his circulation or underlying issues that we may be missing because nobody has done any imaging.  He has not had fevers or chills and has noted improvement    He is fasting today and would like to do his annual routine labs while he is here.  His initial blood pressures have been elevated in looking back, his wife notes that he does eat a lot of sodium patient does not believe he does.  He also has not been drinking a lot of water but is considering increasing that though does not like having to use the bathroom more.  He states he does not have prostate problems but does note urinary frequency when he drinks more water    Review of Systems   Constitutional, HEENT, cardiovascular, pulmonary, gi and gu systems are negative, except as otherwise noted.      Objective    /70   Pulse 68   Temp 97.7  F (36.5  C) (Temporal)   Resp 18   Ht 1.795 m (5' 10.67\")   Wt 89.4 kg (197 lb)   SpO2 97%   BMI 27.73 kg/m    Body mass index is 27.73 kg/m .  Physical Exam   GENERAL: healthy, alert and no distress  NECK: no adenopathy, no asymmetry, masses, or scars and thyroid normal to palpation  RESP: lungs clear to auscultation - no rales, rhonchi or wheezes  CV: regular rate and rhythm, normal S1 S2, no S3 or S4, no murmur, click or rub, no peripheral edema and peripheral pulses strong  MS: right knee- no gross musculoskeletal defects noted, no medial or lateral joint line tenderness no tenderness over the prepatellar tendon or patella  MS: Right foot and ankle range of motion is full he has no tenderness to palpation over the medial or lateral malleoli he is tender to palpation over the tarsals in general, he has " no metatarsal or phalangeal tenderness to palpation does have some mild edema that is not warm involving the right great toe and the medial aspect of his foot, pedal pulses are present and symmetric at the dorsalis pedis and the posterior tibialis both feet are warm, initially his right foot was more erythematous in general than his left.  He had just removed his left sock and shoe at my request and previously had removed his right shoe and sock before I entered the room.  During our exam the color of bilateral feet normalized though his right foot remained slightly more erythematous in the areas of edema both feet were warm sensation was intact distally, he does have hair on all of his toes bilaterally, no tenderness of his calf muscles bilaterally no calf swelling  SKIN: no suspicious lesions or rashes  NEURO: Normal strength and tone, mentation intact and speech normal  PSYCH: mentation appears normal, affect normal/bright    Results for orders placed or performed in visit on 09/25/23   XR Foot Right G/E 3 Views     Status: None    Narrative    FOOT THREE VIEWS RIGHT  9/25/2023 10:10 AM     HISTORY: History of gout; Right foot pain  COMPARISON: None.    FINDINGS: There is no significant degenerative change. The Lisfranc  joint appears unremarkable in these views. The plantar arch is  unremarkable in these views. There is no acute fracture or  dislocation. There are no worrisome bony lesions. Small plantar  calcaneal enthesophyte.      Impression    IMPRESSION: No acute osseous abnormality demonstrated.    JAZMÍN GARCIA MD         SYSTEM ID:  [id number]   Results for orders placed or performed in visit on 09/25/23   CBC with platelets     Status: Normal   Result Value Ref Range    WBC Count 6.0 4.0 - 11.0 10e3/uL    RBC Count 5.27 4.40 - 5.90 10e6/uL    Hemoglobin 16.8 13.3 - 17.7 g/dL    Hematocrit 48.5 40.0 - 53.0 %    MCV 92 78 - 100 fL    MCH 31.9 26.5 - 33.0 pg    MCHC 34.6 31.5 - 36.5 g/dL    RDW 11.7  10.0 - 15.0 %    Platelet Count 189 150 - 450 10e3/uL

## 2023-09-26 RX ORDER — INDOMETHACIN 50 MG/1
50 CAPSULE ORAL
Qty: 30 CAPSULE | Refills: 0 | Status: SHIPPED | OUTPATIENT
Start: 2023-09-26 | End: 2023-10-06

## 2024-10-12 ENCOUNTER — HEALTH MAINTENANCE LETTER (OUTPATIENT)
Age: 64
End: 2024-10-12

## 2025-02-11 ENCOUNTER — MYC MEDICAL ADVICE (OUTPATIENT)
Dept: FAMILY MEDICINE | Facility: CLINIC | Age: 65
End: 2025-02-11
Payer: COMMERCIAL

## 2025-02-25 NOTE — TELEPHONE ENCOUNTER
Left message for pt to return our call    Send letter in one week if no return call / appt    
MyChart read, closing encounter.  
Patient Quality Outreach    Patient is due for the following:   Physical Annual Wellness Visit    Action(s) Taken:   Schedule a Annual Wellness Visit    Type of outreach:    Sent [employer] message.    Questions for provider review:    None           Myrtle BUSCH MA  Chart routed to Care Team.    
No

## 2025-09-03 SDOH — HEALTH STABILITY: PHYSICAL HEALTH: ON AVERAGE, HOW MANY DAYS PER WEEK DO YOU ENGAGE IN MODERATE TO STRENUOUS EXERCISE (LIKE A BRISK WALK)?: 3 DAYS

## 2025-09-03 SDOH — HEALTH STABILITY: PHYSICAL HEALTH: ON AVERAGE, HOW MANY MINUTES DO YOU ENGAGE IN EXERCISE AT THIS LEVEL?: 60 MIN

## 2025-09-04 ENCOUNTER — OFFICE VISIT (OUTPATIENT)
Dept: FAMILY MEDICINE | Facility: CLINIC | Age: 65
End: 2025-09-04
Payer: COMMERCIAL

## 2025-09-04 VITALS
HEART RATE: 74 BPM | BODY MASS INDEX: 25.9 KG/M2 | SYSTOLIC BLOOD PRESSURE: 118 MMHG | WEIGHT: 185 LBS | RESPIRATION RATE: 12 BRPM | HEIGHT: 71 IN | DIASTOLIC BLOOD PRESSURE: 72 MMHG | TEMPERATURE: 98 F | OXYGEN SATURATION: 97 %

## 2025-09-04 DIAGNOSIS — Z87.39 HX OF GOUT: ICD-10-CM

## 2025-09-04 DIAGNOSIS — E78.1 HYPERTRIGLYCERIDEMIA: ICD-10-CM

## 2025-09-04 DIAGNOSIS — Z00.00 ENCOUNTER FOR MEDICARE ANNUAL WELLNESS EXAM: Primary | ICD-10-CM

## 2025-09-04 DIAGNOSIS — Z12.5 ENCOUNTER FOR SCREENING FOR MALIGNANT NEOPLASM OF PROSTATE: ICD-10-CM

## 2025-09-04 DIAGNOSIS — Q85.02 NEUROFIBROMATOSIS, TYPE 2 (ACOUSTIC NEUROFIBROMATOSIS) (H): ICD-10-CM

## 2025-09-04 DIAGNOSIS — Z86.018 HISTORY OF ACOUSTIC NEUROMA: ICD-10-CM

## 2025-09-04 LAB
ALBUMIN SERPL BCG-MCNC: 4.3 G/DL (ref 3.5–5.2)
ALP SERPL-CCNC: 93 U/L (ref 40–150)
ALT SERPL W P-5'-P-CCNC: 26 U/L (ref 0–70)
ANION GAP SERPL CALCULATED.3IONS-SCNC: 8 MMOL/L (ref 7–15)
AST SERPL W P-5'-P-CCNC: 26 U/L (ref 0–45)
BASOPHILS # BLD AUTO: <0.04 10E3/UL (ref 0–0.2)
BASOPHILS NFR BLD AUTO: 0.5 %
BILIRUB SERPL-MCNC: 1.1 MG/DL
BUN SERPL-MCNC: 15.2 MG/DL (ref 8–23)
CALCIUM SERPL-MCNC: 9.7 MG/DL (ref 8.8–10.4)
CHLORIDE SERPL-SCNC: 104 MMOL/L (ref 98–107)
CHOLEST SERPL-MCNC: 192 MG/DL
CREAT SERPL-MCNC: 1.22 MG/DL (ref 0.67–1.17)
EGFRCR SERPLBLD CKD-EPI 2021: 65 ML/MIN/1.73M2
EOSINOPHIL # BLD AUTO: 0.16 10E3/UL (ref 0–0.7)
EOSINOPHIL NFR BLD AUTO: 2.6 %
ERYTHROCYTE [DISTWIDTH] IN BLOOD BY AUTOMATED COUNT: 13 % (ref 10–15)
FASTING STATUS PATIENT QL REPORTED: YES
FASTING STATUS PATIENT QL REPORTED: YES
GLUCOSE SERPL-MCNC: 99 MG/DL (ref 70–99)
HCO3 SERPL-SCNC: 28 MMOL/L (ref 22–29)
HCT VFR BLD AUTO: 53.3 % (ref 40–53)
HDLC SERPL-MCNC: 34 MG/DL
HGB BLD-MCNC: 18.2 G/DL (ref 13.3–17.7)
IMM GRANULOCYTES # BLD: <0.04 10E3/UL
IMM GRANULOCYTES NFR BLD: 0.2 %
LDLC SERPL CALC-MCNC: 132 MG/DL
LYMPHOCYTES # BLD AUTO: 0.9 10E3/UL (ref 0.8–5.3)
LYMPHOCYTES NFR BLD AUTO: 14.8 %
MCH RBC QN AUTO: 31.6 PG (ref 26.5–33)
MCHC RBC AUTO-ENTMCNC: 34.1 G/DL (ref 31.5–36.5)
MCV RBC AUTO: 92.5 FL (ref 78–100)
MONOCYTES # BLD AUTO: 0.44 10E3/UL (ref 0–1.3)
MONOCYTES NFR BLD AUTO: 7.2 %
NEUTROPHILS # BLD AUTO: 4.53 10E3/UL (ref 1.6–8.3)
NEUTROPHILS NFR BLD AUTO: 74.7 %
NONHDLC SERPL-MCNC: 158 MG/DL
PLATELET # BLD AUTO: 156 10E3/UL (ref 150–450)
POTASSIUM SERPL-SCNC: 4.8 MMOL/L (ref 3.4–5.3)
PROT SERPL-MCNC: 7.1 G/DL (ref 6.4–8.3)
PSA SERPL DL<=0.01 NG/ML-MCNC: 1.4 NG/ML (ref 0–4.5)
RBC # BLD AUTO: 5.76 10E6/UL (ref 4.4–5.9)
SODIUM SERPL-SCNC: 140 MMOL/L (ref 135–145)
TRIGL SERPL-MCNC: 132 MG/DL
URATE SERPL-MCNC: 4.5 MG/DL (ref 3.4–7)
WBC # BLD AUTO: 6.07 10E3/UL (ref 4–11)

## 2025-09-04 ASSESSMENT — PAIN SCALES - GENERAL: PAINLEVEL_OUTOF10: NO PAIN (0)
